# Patient Record
Sex: FEMALE | Race: WHITE
[De-identification: names, ages, dates, MRNs, and addresses within clinical notes are randomized per-mention and may not be internally consistent; named-entity substitution may affect disease eponyms.]

---

## 2018-11-07 ENCOUNTER — HOSPITAL ENCOUNTER (OUTPATIENT)
Dept: HOSPITAL 88 - DX | Age: 63
End: 2018-11-07
Attending: PSYCHIATRY & NEUROLOGY
Payer: COMMERCIAL

## 2018-11-07 DIAGNOSIS — H47.10: Primary | ICD-10-CM

## 2018-11-07 LAB
CEREBROV LIN CELL CSF QL MICRO: (no result) %
CLARITY CSF: CLEAR
COLOR CSF: COLORLESS
DEPRECATED APTT PLAS QN: 28.4 SECONDS (ref 23.8–35.5)
DEPRECATED INR PLAS: 0.89
GLUCOSE CSF-MCNC: 51 MG/DL (ref 40–70)
LYMPHOCYTES NFR CSF: (no result) % (ref 40–80)
MONOCYTES NFR CSF: (no result) %
PROT CSF-MCNC: 30.2 MG/DL (ref 15–40)
PROTHROMBIN TIME: 12.9 SECONDS (ref 11.9–14.5)
TUBE # CSF: 3

## 2018-11-07 PROCEDURE — 84157 ASSAY OF PROTEIN OTHER: CPT

## 2018-11-07 PROCEDURE — 62270 DX LMBR SPI PNXR: CPT

## 2018-11-07 PROCEDURE — 85610 PROTHROMBIN TIME: CPT

## 2018-11-07 PROCEDURE — 82945 GLUCOSE OTHER FLUID: CPT

## 2018-11-07 PROCEDURE — 89051 BODY FLUID CELL COUNT: CPT

## 2018-11-07 PROCEDURE — 85730 THROMBOPLASTIN TIME PARTIAL: CPT

## 2018-11-07 PROCEDURE — 77003 FLUOROGUIDE FOR SPINE INJECT: CPT

## 2018-11-07 PROCEDURE — 85049 AUTOMATED PLATELET COUNT: CPT

## 2018-11-07 PROCEDURE — 36415 COLL VENOUS BLD VENIPUNCTURE: CPT

## 2018-11-07 NOTE — DIAGNOSTIC IMAGING REPORT
EXAMINATION: Fluoroscopically-guided lumbar puncture



HISTORY: Papilledema 



TECHNIQUE:

  medication: None.

  anesthesia: 1% lidocaine, 5 cc

  needle: 22 gauge x 5 inch spinal

  fluoro time: 2.9 minutes

  DAP: 224.5 microGy-m2



PROCEDURE:

After giving informed consent, the patient lay prone on the examination table.

The back was prepped and draped in the usual sterile manner, and then 1%

lidocaine was infiltrated in the skin.  radiograph demonstrates

cholecystectomy clips, aortic atherosclerosis, and mild degenerative disc and

facet degenerative changes of the lower lumbar spine. The spinal needle was

advanced through the L4-L5 interspace via a left sided approach until CSF was

obtained. Opening pressure obtained was 15 cm H20. Approximately 12 mL of clear

fluid was removed and sent to the laboratory for tests ordered by the referring

physician. The patient was transferred to the floor in stable condition. 



FINDINGS:

Opening pressure measurement 15 cm H20

CSF:  Clear serous



IMPRESSION:

Fluoroscopically-guided L4-L5 lumbar puncture as above. 



Signed by: Dr. Donis Thompson MD on 11/7/2018 12:47 PM

## 2023-04-13 ENCOUNTER — HOSPITAL ENCOUNTER (EMERGENCY)
Dept: HOSPITAL 88 - ER | Age: 68
Discharge: HOME | End: 2023-04-13
Payer: MEDICARE

## 2023-04-13 VITALS — DIASTOLIC BLOOD PRESSURE: 60 MMHG | SYSTOLIC BLOOD PRESSURE: 165 MMHG

## 2023-04-13 VITALS — WEIGHT: 251 LBS | HEIGHT: 61 IN | BODY MASS INDEX: 47.39 KG/M2
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
## 2023-04-13 DIAGNOSIS — S61.211A: Primary | ICD-10-CM

## 2023-04-13 DIAGNOSIS — E78.00: ICD-10-CM
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
## 2023-04-13 PROCEDURE — 90714 TD VACC NO PRESV 7 YRS+ IM: CPT

## 2023-04-13 PROCEDURE — 99284 EMERGENCY DEPT VISIT MOD MDM: CPT

## 2023-04-13 PROCEDURE — 90471 IMMUNIZATION ADMIN: CPT
